# Patient Record
Sex: MALE | Race: WHITE | NOT HISPANIC OR LATINO | Employment: FULL TIME | URBAN - METROPOLITAN AREA
[De-identification: names, ages, dates, MRNs, and addresses within clinical notes are randomized per-mention and may not be internally consistent; named-entity substitution may affect disease eponyms.]

---

## 2019-01-11 ENCOUNTER — HOSPITAL ENCOUNTER (OUTPATIENT)
Dept: OTHER | Facility: HOSPITAL | Age: 49
Discharge: HOME | End: 2019-01-11
Attending: FAMILY MEDICINE | Admitting: FAMILY MEDICINE

## 2019-08-14 ENCOUNTER — TELEPHONE (OUTPATIENT)
Dept: FAMILY MEDICINE CLINIC | Facility: CLINIC | Age: 49
End: 2019-08-14

## 2019-11-21 ENCOUNTER — OFFICE VISIT (OUTPATIENT)
Dept: FAMILY MEDICINE CLINIC | Facility: CLINIC | Age: 49
End: 2019-11-21

## 2019-11-21 VITALS
HEIGHT: 67 IN | RESPIRATION RATE: 16 BRPM | WEIGHT: 165 LBS | OXYGEN SATURATION: 98 % | BODY MASS INDEX: 25.9 KG/M2 | DIASTOLIC BLOOD PRESSURE: 80 MMHG | HEART RATE: 49 BPM | SYSTOLIC BLOOD PRESSURE: 120 MMHG | TEMPERATURE: 98 F

## 2019-11-21 DIAGNOSIS — N13.8 BENIGN PROSTATIC HYPERPLASIA WITH URINARY OBSTRUCTION: ICD-10-CM

## 2019-11-21 DIAGNOSIS — N40.1 BENIGN PROSTATIC HYPERPLASIA WITH URINARY OBSTRUCTION: ICD-10-CM

## 2019-11-21 DIAGNOSIS — Z00.00 PREVENTATIVE HEALTH CARE: Primary | ICD-10-CM

## 2019-11-21 DIAGNOSIS — D48.0 SYNOVIAL CHONDROMATOSIS: ICD-10-CM

## 2019-11-21 DIAGNOSIS — Z23 NEED FOR IMMUNIZATION AGAINST INFLUENZA: ICD-10-CM

## 2019-11-21 PROCEDURE — 90674 CCIIV4 VAC NO PRSV 0.5 ML IM: CPT | Performed by: FAMILY MEDICINE

## 2019-11-21 PROCEDURE — 90471 IMMUNIZATION ADMIN: CPT | Performed by: FAMILY MEDICINE

## 2019-11-21 PROCEDURE — 99386 PREV VISIT NEW AGE 40-64: CPT | Performed by: FAMILY MEDICINE

## 2019-11-21 RX ORDER — CHLORAL HYDRATE 500 MG
CAPSULE ORAL DAILY
COMMUNITY

## 2019-11-21 RX ORDER — UBIDECARENONE 60 MG
CAPSULE ORAL DAILY
COMMUNITY

## 2019-11-21 RX ORDER — DIPHENHYDRAMINE HCL 25 MG
25 TABLET ORAL
COMMUNITY

## 2020-06-05 ENCOUNTER — OFFICE VISIT (OUTPATIENT)
Dept: FAMILY MEDICINE CLINIC | Facility: CLINIC | Age: 50
End: 2020-06-05

## 2020-06-05 VITALS
HEIGHT: 67 IN | RESPIRATION RATE: 16 BRPM | DIASTOLIC BLOOD PRESSURE: 80 MMHG | BODY MASS INDEX: 25.43 KG/M2 | WEIGHT: 162 LBS | HEART RATE: 58 BPM | TEMPERATURE: 97.7 F | OXYGEN SATURATION: 98 % | SYSTOLIC BLOOD PRESSURE: 130 MMHG

## 2020-06-05 DIAGNOSIS — K40.91 UNILATERAL RECURRENT INGUINAL HERNIA WITHOUT OBSTRUCTION OR GANGRENE: Primary | ICD-10-CM

## 2020-06-05 DIAGNOSIS — N13.8 BENIGN PROSTATIC HYPERPLASIA WITH URINARY OBSTRUCTION: ICD-10-CM

## 2020-06-05 DIAGNOSIS — N40.1 BENIGN PROSTATIC HYPERPLASIA WITH URINARY OBSTRUCTION: ICD-10-CM

## 2020-06-05 PROCEDURE — 99213 OFFICE O/P EST LOW 20 MIN: CPT | Performed by: FAMILY MEDICINE

## 2020-06-05 NOTE — PATIENT INSTRUCTIONS
Inguinal Hernia, Adult  An inguinal hernia develops when fat or the intestines push through a weak spot in a muscle where your leg meets your lower abdomen (groin). This creates a bulge. This kind of hernia could also be:  · In your scrotum, if you are male.  · In folds of skin around your vagina, if you are female.  There are three types of inguinal hernias:  · Hernias that can be pushed back into the abdomen (are reducible). This type rarely causes pain.  · Hernias that are not reducible (are incarcerated).  · Hernias that are not reducible and lose their blood supply (are strangulated). This type of hernia requires emergency surgery.  What are the causes?  This condition is caused by having a weak spot in the muscles or tissues in the groin. This weak spot develops over time. The hernia may poke through the weak spot when you suddenly strain your lower abdominal muscles, such as when you:  · Lift a heavy object.  · Strain to have a bowel movement. Constipation can lead to straining.  · Cough.  What increases the risk?  This condition is more likely to develop in:  · Men.  · Pregnant women.  · People who:  ? Are overweight.  ? Work in jobs that require long periods of standing or heavy lifting.  ? Have had an inguinal hernia before.  ? Smoke or have lung disease. These factors can lead to long-lasting (chronic) coughing.  What are the signs or symptoms?  Symptoms may depend on the size of the hernia. Often, a small inguinal hernia has no symptoms. Symptoms of a larger hernia may include:  · A lump in the groin area. This is easier to see when standing. It might not be visible when lying down.  · Pain or burning in the groin. This may get worse when lifting, straining, or coughing.  · A dull ache or a feeling of pressure in the groin.  · In men, an unusual lump in the scrotum.  Symptoms of a strangulated inguinal hernia may include:  · A bulge in your groin that is very painful and tender to the touch.  · A bulge  that turns red or purple.  · Fever, nausea, and vomiting.  · Inability to have a bowel movement or to pass gas.  How is this diagnosed?  This condition is diagnosed based on your symptoms, your medical history, and a physical exam. Your health care provider may feel your groin area and ask you to cough.  How is this treated?  Treatment depends on the size of your hernia and whether you have symptoms. If you do not have symptoms, your health care provider may have you watch your hernia carefully and have you come in for follow-up visits. If your hernia is large or if you have symptoms, you may need surgery to repair the hernia.  Follow these instructions at home:  Lifestyle  · Avoid lifting heavy objects.  · Avoid standing for long periods of time.  · Do not use any products that contain nicotine or tobacco, such as cigarettes and e-cigarettes. If you need help quitting, ask your health care provider.  · Maintain a healthy weight.  Preventing constipation  · Take actions to prevent constipation. Constipation leads to straining with bowel movements, which can make a hernia worse or cause a hernia repair to break down. Your health care provider may recommend that you:  ? Drink enough fluid to keep your urine pale yellow.  ? Eat foods that are high in fiber, such as fresh fruits and vegetables, whole grains, and beans.  ? Limit foods that are high in fat and processed sugars, such as fried or sweet foods.  ? Take an over-the-counter or prescription medicine for constipation.  General instructions  · You may try to push the hernia back in place by very gently pressing on it while lying down. Do not try to force the bulge back in if it will not push in easily.  · Watch your hernia for any changes in shape, size, or color. Get help right away if you notice any changes.  · Take over-the-counter and prescription medicines only as told by your health care provider.  · Keep all follow-up visits as told by your health care  provider. This is important.  Contact a health care provider if:  · You have a fever.  · You develop new symptoms.  · Your symptoms get worse.  Get help right away if:  · You have pain in your groin that suddenly gets worse.  · You have a bulge in your groin that:  ? Suddenly gets bigger and does not get smaller.  ? Becomes red or purple or painful to the touch.  · You are a man and you have a sudden pain in your scrotum, or the size of your scrotum suddenly changes.  · You cannot push the hernia back in place by very gently pressing on it when you are lying down. Do not try to force the bulge back in if it will not push in easily.  · You have nausea or vomiting that does not go away.  · You have a fast heartbeat.  · You cannot have a bowel movement or pass gas.  These symptoms may represent a serious problem that is an emergency. Do not wait to see if the symptoms will go away. Get medical help right away. Call your local emergency services (911 in the [place]  Summary  · An inguinal hernia develops when fat or the intestines push through a weak spot in a muscle where your leg meets your lower abdomen (groin).  · This condition is caused by having a weak spot in muscles or tissue in your groin.  · Symptoms may depend on the size of the hernia, and they may include pain or swelling in your groin. A small inguinal hernia often has no symptoms.  · Treatment may not be needed if you do not have symptoms. If you have symptoms or a large hernia, you may need surgery to repair the hernia.  · Avoid lifting heavy objects. Also avoid standing for long amounts of time.  This information is not intended to replace advice given to you by your health care provider. Make sure you discuss any questions you have with your health care provider.  Document Released: 05/06/2010 Document Revised: 01/19/2019 Document Reviewed: 09/19/2018  Elsevier Patient Education © 2020 [employer]

## 2020-06-05 NOTE — PROGRESS NOTES
Rooming Tab(CC,VS,Pt Hx,Fall Screen)  Chief Complaint   Patient presents with   • Inguinal Hernia       Subjective    Patient is here to be checked for possible hernia in the inguinal area.  At patient's place for employment he has to occasionally lift heavy objects.  It is not a daily event but can be 5-6  times a week.  To prepare for the lifting he works out every other day trying to keep himself strong  so that when he does have to lift he is able to do that safely.  He had a sensation the other day of a uncomfortable feeling in the inguinal area where we have been watching for possible hernia development.  He is here today to recheck that area.  A piece of information it is important but I had forgotten about until today when the patient related it back to me is that when he was a young man preteen he had surgery laparoscopically where they went and  Repaired what he called a triple hernia.  As best I can tell from his description he had bilateral inguinal hernias and I think an umbilical hernia.  He does have small scars in his lower abdomen that were used to put the scopes through.  So what we are actually looking for now is a possible recurrent inguinal hernia.  He thinks they put mesh in with the first surgery although that was quite a few years ago and I am not sure they were using mesh routinely.    I have reviewed and updated his medications, medical history and problem list during today's office visit.     Patient Care Team:  Fuentes Barone MD as PCP - General (Family Medicine)    Problem List Tab  Medications Tab  Synopsis Tab  Chart Review Tab  Care Everywhere Tab  Immunizations Tab  Patient History Tab    Social History     Tobacco Use   • Smoking status: Never Smoker   • Smokeless tobacco: Never Used   Substance Use Topics   • Alcohol use: No     Frequency: Never       Review of Systems   Constitutional: Negative for activity change, appetite change, fatigue, fever and unexpected weight loss.  "  HENT: Negative for congestion, ear pain, hearing loss, postnasal drip, rhinorrhea, sinus pressure, sneezing, sore throat and swollen glands.    Eyes: Negative for blurred vision.   Respiratory: Negative for cough, shortness of breath and wheezing.    Cardiovascular: Negative for chest pain.   Gastrointestinal: Negative for abdominal pain, nausea and indigestion.   Genitourinary: Negative for dysuria, urgency and urinary incontinence.        Slight pressure and a sensation of bulging in the right inguinal area whenever he coughs or sneezes or strains himself.   Musculoskeletal: Negative for arthralgias, back pain, joint swelling and myalgias.   Skin: Negative for dry skin and skin lesions.   Allergic/Immunologic: Negative for environmental allergies.   Neurological: Negative for dizziness, headache, memory problem and confusion.   Hematological: Negative for adenopathy.   Psychiatric/Behavioral: Negative for agitation, depressed mood and stress. The patient is not nervous/anxious.    All other systems reviewed and are negative.      Objective     Rooming Tab(CC,VS,Pt Hx,Fall Screen)  /80 (BP Location: Right arm, Cuff Size: Adult)   Pulse 58   Temp 97.7 °F (36.5 °C)   Resp 16   Ht 170.2 cm (67\")   Wt 73.5 kg (162 lb)   SpO2 98%   BMI 25.37 kg/m²     Body mass index is 25.37 kg/m².    Physical Exam   Constitutional: He is oriented to person, place, and time. He appears well-developed and well-nourished.   HENT:   Head: Normocephalic and atraumatic.   Right Ear: External ear normal.   Left Ear: External ear normal.   Nose: Nose normal.   Mouth/Throat: Oropharynx is clear and moist. No oropharyngeal exudate.   Eyes: Pupils are equal, round, and reactive to light. Conjunctivae and EOM are normal. Right eye exhibits no discharge. Left eye exhibits no discharge. No scleral icterus.   Neck: Normal range of motion. Neck supple. No JVD present. No thyromegaly present.   Cardiovascular: Normal rate, regular " rhythm, normal heart sounds and intact distal pulses.   No murmur heard.  Pulmonary/Chest: Effort normal and breath sounds normal. No respiratory distress. He has no wheezes. He has no rales. He exhibits no tenderness.   Abdominal: Soft. Bowel sounds are normal. He exhibits no distension. There is no tenderness.   Genitourinary: Rectal exam shows guaiac negative stool.   Genitourinary Comments: Right inguinal area has a very minimal bulge to the inguinal canal when he coughs or has any intra-abdominal pressure increase.  I can feel the internal ring of the canal and there is no indirect hernia.  There is a bulging coming from the posterior inguinal canal wall which is probably where the hernia is going to go through if it comes out.  For now we will just watch and see how this goes for the next year.  We will send him to a surgeon if it gets any bigger or if it becomes symptomatic.  He knows what to watch for   Musculoskeletal: Normal range of motion. He exhibits no edema, tenderness or deformity.   Lymphadenopathy:     He has no cervical adenopathy.   Neurological: He is alert and oriented to person, place, and time.   Skin: Skin is warm and dry.   Psychiatric: He has a normal mood and affect. His behavior is normal. Judgment and thought content normal.   Vitals reviewed.       Statin Choice Calculator  Data Reviewed:                   Assessment/Plan   Order Review Tab  Health Maintenance Tab  Patient Plan/Order Tab  Diagnoses and all orders for this visit:    1. Unilateral recurrent inguinal hernia without obstruction or gangrene (Primary)  Assessment & Plan:  I do not really feel a definite hernia on the right.  The inguinal canal does seem a little loose compared to the left but has had surgery before.  There is not a direct inguinal hernia and if there is one coming it is coming probably through the back wall.  Its not fully ruptured right now but he can feel a bulge when he coughs.  I think we are good to  wait for any procedure but if he would like to get a surgical opinion I will send him to the surgery group now.  I do not think anything needs to be done right now.  He needs to keep his weight down and do some core muscle workouts.  Strengthen his core muscles and use a lifting back brace when he is in the gym.      2. Benign prostatic hyperplasia with urinary obstruction  Assessment & Plan:  Right inguinal area with weakness in the back wall of the canal        Wrapup Tab  Return in about 1 year (around 6/5/2021) for Recheck.

## 2020-06-07 NOTE — ASSESSMENT & PLAN NOTE
I do not really feel a definite hernia on the right.  The inguinal canal does seem a little loose compared to the left but has had surgery before.  There is not a direct inguinal hernia and if there is one coming it is coming probably through the back wall.  Its not fully ruptured right now but he can feel a bulge when he coughs.  I think we are good to wait for any procedure but if he would like to get a surgical opinion I will send him to the surgery group now.  I do not think anything needs to be done right now.  He needs to keep his weight down and do some core muscle workouts.  Strengthen his core muscles and use a lifting back brace when he is in the gym.

## 2021-08-04 ENCOUNTER — TELEPHONE (OUTPATIENT)
Dept: FAMILY MEDICINE CLINIC | Facility: CLINIC | Age: 51
End: 2021-08-04

## 2021-08-04 DIAGNOSIS — Z20.828 EXPOSURE TO SARS-ASSOCIATED CORONAVIRUS: Primary | ICD-10-CM

## 2021-08-04 NOTE — TELEPHONE ENCOUNTER
Spoke with patient and scheduled a [facility] appt for 8/5/2021 at 9:10am.  Patient has NOT had any of the vaccines.  Please put appropriate orders in.

## 2021-08-04 NOTE — TELEPHONE ENCOUNTER
PATIENT CALLED IN AND STATED HE WOULD LIKE TO BE TESTED FOR THE COVID ANTIBODIES.    PLEASE ADVISE    CALL BACK [phone]

## 2021-08-05 ENCOUNTER — LAB (OUTPATIENT)
Dept: FAMILY MEDICINE CLINIC | Facility: CLINIC | Age: 51
End: 2021-08-05

## 2021-08-05 DIAGNOSIS — Z20.828 EXPOSURE TO SARS-ASSOCIATED CORONAVIRUS: ICD-10-CM

## 2021-08-05 PROCEDURE — 36415 COLL VENOUS BLD VENIPUNCTURE: CPT

## 2021-08-05 PROCEDURE — 86769 SARS-COV-2 COVID-19 ANTIBODY: CPT | Performed by: FAMILY MEDICINE

## 2021-08-06 LAB — SARS-COV-2 AB SERPL QL IA: NEGATIVE

## 2021-08-17 ENCOUNTER — TELEPHONE (OUTPATIENT)
Dept: FAMILY MEDICINE CLINIC | Facility: CLINIC | Age: 51
End: 2021-08-17

## 2021-08-17 NOTE — TELEPHONE ENCOUNTER
Caller: TRESSA STOREY    Relationship: Emergency Contact    Best call back number:[phone]     What is the best time to reach you: ANY    Who are you requesting to speak with (clinical staff, provider,  specific staff member): DR CABALLERO    Do you know the name of the person who called: FANNIE BUSTILLOS    What was the call regarding:  MILAGRO CALLED AND WOULD LIKE  A CALL BACK FROM DR CABALLERO TO SPEAK TO HIS WIFE IN REGARDS TO THE VACCINATIONS FOR COVID. PATIENT PREVIOUSLY SENT A [employer] MESSAGE AND THE WIFE THINKS THAT DR MADISON MEDICAL STAFF ANSWERED THE MESSAGE. STATED HIS WIFES FAMILY  HAD PREVIOUSLY SPOKEN TO DR CABALLERO AND THE ANSWERS FROM DR CABALLERO TO THEM  AND THE MESSAGE DO NOT SOUND THE SAME. WOULD LIKE A CALL BACK TO DISCUSS DR MADISON THOUGHTS ON THE FAMILY GETTING THE VACCINATION. WILL NEED TO DECIDE BY THE END OF THIS MONTH,  IF DR CABALLERO CAN CONVINCE HIS WIFE THAT HE SHOULD GET IT, WOULD BE GREATLY APPRECIATIVE , THAT THE SHOT IS SAFE     Do you require a callback: YES

## 2021-08-17 NOTE — TELEPHONE ENCOUNTER
Gave message to patient's wife at 10:50am.  In a very angry voice patient's wife said we are ruining the lives of so many people and that was very disgusting.  And she hung up on me.

## 2021-08-18 NOTE — TELEPHONE ENCOUNTER
I spoke with Franklyn myrick.  He is in a tough spot.  His employer is mandating everyone be vaccinated if they want to keep their jobs.  He is a  for the [employer]  family.   Even though he is outside 95% of the time and could stay outside 100% of the time they are not back and down on the requirement for every 100 their appointment to be vaccinated.  His wife was concerned about the safety.  I reassured him that my feeling was that the vaccine is very safe and that I think it would be good in getting it.  Nonetheless it is his call in decision making.  We either are going to deal with a virus or we are going to deal with the vaccine.  I feel confident that everyone will get the virus eventually if they do not get the vaccine.  The question is do you want to deal with the virus itself already want to deal with the vaccine.  I think the safety would be in favor of taking the vaccine.  He is in other wise very healthy young man so he would probably do fine getting the virus but I cannot predict that for him.  He can be one of the strange patients that are young otherwise healthy and die from it.  It is a well paying job with good benefits so he has a tough decision to make.